# Patient Record
Sex: FEMALE | URBAN - METROPOLITAN AREA
[De-identification: names, ages, dates, MRNs, and addresses within clinical notes are randomized per-mention and may not be internally consistent; named-entity substitution may affect disease eponyms.]

---

## 2023-10-16 ENCOUNTER — OFFICE VISIT (OUTPATIENT)
Dept: ENDOCRINOLOGY CLINIC | Facility: CLINIC | Age: 43
End: 2023-10-16

## 2023-10-16 VITALS — DIASTOLIC BLOOD PRESSURE: 70 MMHG | WEIGHT: 293 LBS | HEART RATE: 77 BPM | SYSTOLIC BLOOD PRESSURE: 101 MMHG

## 2023-10-16 DIAGNOSIS — E03.8 HYPOTHYROIDISM DUE TO HASHIMOTO'S THYROIDITIS: Primary | ICD-10-CM

## 2023-10-16 DIAGNOSIS — E06.3 HYPOTHYROIDISM DUE TO HASHIMOTO'S THYROIDITIS: Primary | ICD-10-CM

## 2023-10-16 DIAGNOSIS — E04.9 THYROID ENLARGEMENT: ICD-10-CM

## 2023-10-16 PROCEDURE — 99203 OFFICE O/P NEW LOW 30 MIN: CPT | Performed by: INTERNAL MEDICINE

## 2023-10-16 RX ORDER — LIOTHYRONINE SODIUM 5 UG/1
5 TABLET ORAL DAILY
Qty: 90 TABLET | Refills: 1 | Status: SHIPPED | OUTPATIENT
Start: 2023-10-16

## 2023-10-16 RX ORDER — LEVOTHYROXINE SODIUM 0.07 MG/1
75 TABLET ORAL
Qty: 90 TABLET | Refills: 1 | Status: SHIPPED | OUTPATIENT
Start: 2023-10-16

## 2023-10-16 RX ORDER — LEVOTHYROXINE SODIUM 88 UG/1
88 TABLET ORAL
COMMUNITY
End: 2023-10-16

## 2023-10-16 RX ORDER — PREDNISONE 20 MG/1
15 TABLET ORAL DAILY
COMMUNITY

## 2023-10-16 RX ORDER — CLINDAMYCIN HYDROCHLORIDE 300 MG/1
1 CAPSULE ORAL 3 TIMES DAILY
COMMUNITY

## 2023-10-16 RX ORDER — HYDROCODONE POLISTIREX AND CHLORPHENIRAMINE POLISTIREX 10; 8 MG/5ML; MG/5ML
SUSPENSION, EXTENDED RELEASE ORAL
COMMUNITY

## 2023-10-16 RX ORDER — NAPROXEN SODIUM 550 MG/1
1 TABLET ORAL EVERY 12 HOURS
COMMUNITY

## 2023-10-16 RX ORDER — FEXOFENADINE HCL AND PSEUDOEPHEDRINE HCI 180; 240 MG/1; MG/1
TABLET, EXTENDED RELEASE ORAL
COMMUNITY

## 2023-10-16 RX ORDER — HYDROCODONE BITARTRATE AND ACETAMINOPHEN 5; 325 MG/1; MG/1
TABLET ORAL
COMMUNITY

## 2023-10-16 NOTE — PROGRESS NOTES
Name: Kurt Wilkins  Date: 10/16/2023    Referring Physician: No ref. provider found    Patient presents with:  Consult: Patient would like to see Endocrinologist to help manage hypothyroidism and rule out cushing's disease. Patient reports she stopped thyroid medication a few months ago due to it not helping with weight gain, hair loss, fatigue. HISTORY OF PRESENT ILLNESS   Kurt Wilkins is a 37year old female who presents for Patient presents with:  Consult: Patient would like to see Endocrinologist to help manage hypothyroidism and rule out cushing's disease. Patient reports she stopped thyroid medication a few months ago due to it not helping with weight gain, hair loss, fatigue. 38 y/o F presents for initial evaluation of hypothyroidism. She was diagnosed with thyroid disease in 2019. At that time she was started on levothyroxine therapy and some symptoms did improve. However she notes persistent fatigue and weight gain despite medication. She has now been off levothyroxine for several months since medication was not helping. She was very concerned that she needed higher dose. Previously she was maintained on Levothyroxine 88mcg PO daily. In addition recommended Cushings Disease evaluation but she got lost to follow up secondary to public health emergency. She does have regular menstrual cycles. +weight gain, +hair loss, +eye brow hair loss. She is trying to limit CHO intake and currently only eating one meal per day. No family h/o thyroid disease. Grandmother DM2. REVIEW OF SYSTEMS  Eyes: no change in vision  Neurologic: no headache, generalized or focal weakness or numbness.   Head: normal  ENT: normal  Lungs: no shortness of breath, wheezing or WILKINSON  Cardiovascular:  no chest pain or palpitations  Gastrointestinal:  no abdominal pain, bowel movement problems  Musculoskeletal: no muscle pain or arthralgia  /Gyne: no frequency or discomfort while urinating  Psychiatric:  no acute distress, anxiety  or depression  Skin: normal moisturized skin    Medications:     Current Outpatient Medications:     clindamycin 300 MG Oral Cap, Take 1 capsule (300 mg total) by mouth 3 (three) times daily. (Patient not taking: Reported on 10/16/2023), Disp: , Rfl:     Fexofenadine-Pseudoephed ER (ALLEGRA-D ALLERGY & CONGESTION) 180-240 MG Oral Tablet 24 Hr, TK 1 T QD. (Patient not taking: Reported on 10/16/2023), Disp: , Rfl:     Hydrocod Jose-Chlorphe Jose ER 10-8 MG/5ML Oral Suspension Extended Release, TK 1 TEA PO BID (Patient not taking: Reported on 10/16/2023), Disp: , Rfl:     HYDROcodone-acetaminophen 5-325 MG Oral Tab, , Disp: , Rfl:     Naproxen Sodium 550 MG Oral Tab, Take 1 tablet (550 mg total) by mouth Q12H. (Patient not taking: Reported on 10/16/2023), Disp: , Rfl:     predniSONE 20 MG Oral Tab, Take 15 mL by mouth daily. (Patient not taking: Reported on 10/16/2023), Disp: , Rfl:     levothyroxine 88 MCG Oral Tab, Take 1 tablet (88 mcg total) by mouth before breakfast. (Patient not taking: Reported on 10/16/2023), Disp: , Rfl:      Allergies:     Penicillins             NAUSEA AND VOMITING    Social History:   Social History    Socioeconomic History      Marital status: Unknown      Medical History:   History reviewed. No pertinent past medical history. Surgical history:   History reviewed. No pertinent surgical history. PHYSICAL EXAMINATION:  /70   Pulse 77   Wt (!) 310 lb (140.6 kg)     General Appearance:  Alert, in no acute distress, well developed  Eyes: normal conjunctivae, sclera.   Ears/Nose/Mouth/Throat/Neck:  normal hearing, normal speech and diffusely enlarged thyroid gland  Musculoskeletal:  normal muscle strength and tone  PV: normal pulses of carotids, pedals  Skin:  normal moisture and skin texture  Hair & Nails:  normal scalp hair     Neuro:  sensory grossly intact and motor grossly intact  Psychiatric:  oriented to time, self, and place  Nutritional:  no abnormal weight gain or loss    ASSESSMENT/PLAN:    Hypothyroidism   - Discussed diagnosis with patient  - Discussed importance of long term treatment  - Discussed possible T3 and T4 replacement  - Restart Levothyroxine 75mcg PO daily   - Start Liothyronine 5mcg PO daily   - Discussed taking in AM and waiting 30-60 min before eating  - Verbalized understanding of risks and benefits  - Recheck TSH, FT4, FT3 in 6 weeeks  - Check Salivary cortisol to complete Cushings work up   - Check Vitamin D  - Further management based on above results    RTC 6 months         10/16/2023  Pham Almonte MD

## 2024-01-22 ENCOUNTER — TELEPHONE (OUTPATIENT)
Dept: ENDOCRINOLOGY CLINIC | Facility: CLINIC | Age: 44
End: 2024-01-22

## 2024-01-22 DIAGNOSIS — E04.1 THYROID NODULE: Primary | ICD-10-CM

## 2024-01-23 ENCOUNTER — MED REC SCAN ONLY (OUTPATIENT)
Dept: ENDOCRINOLOGY CLINIC | Facility: CLINIC | Age: 44
End: 2024-01-23

## 2024-01-23 NOTE — TELEPHONE ENCOUNTER
Dr. Dc,  Spoke to patient - states pain in neck and back of head has not gotten any better   Thyroid US results given to HAVEN Hardy to bring to [facility] for your review - patient is anxious for response  Thanks

## 2024-01-24 NOTE — TELEPHONE ENCOUNTER
Dr. Dc, patient states she's never had a thyroid nodule biopsy. She has a copy of US imaging on disc. She would like to proceed with biopsy at [facility]. Patient does not have insurance so is asking how much it would cost, advised I am not sure off hand, but can give her information to [employer] for cost estimate ([phone] when order is placed.

## 2024-01-24 NOTE — TELEPHONE ENCOUNTER
Order placed.  I suspect the FNA will be fairly expensive.  Would she have insurance in the next year?  We could wait.

## 2024-01-24 NOTE — TELEPHONE ENCOUNTER
Please call patient - she does have several large nodules in her thyroid gland.  She has 3 nodules in the right lobe and 1 nodule in the left lobe.  Thyroid nodules are very common in 50-60% of the population.  The risk of thyroid cancer in a nodule is 3-5% so 95-97% are benign.  However two of her nodules are large enough that they qualify for biopsy.  She has not undergone biopsy in the past correct?  Since she had imaging [facility] we can do biopsy at [facility] however they will need to see images of her US.  Can she get disk from [facility] or did she want to have biopsy done somewhere else?  Thanks.

## 2024-01-24 NOTE — TELEPHONE ENCOUNTER
Spoke to patient to relay message below - patient stated she does not want to wait until she has insurance (was not sure if she would have insurance in the next year)   Patient given # to call for cost estimate ([phone]) and # for scheduling   Patient denied further questions at this time and will call if questions arise

## 2024-04-10 RX ORDER — LEVOTHYROXINE SODIUM 0.07 MG/1
75 TABLET ORAL
Qty: 90 TABLET | Refills: 1 | Status: SHIPPED | OUTPATIENT
Start: 2024-04-10

## 2024-04-10 RX ORDER — LIOTHYRONINE SODIUM 5 UG/1
5 TABLET ORAL DAILY
Qty: 90 TABLET | Refills: 1 | Status: SHIPPED | OUTPATIENT
Start: 2024-04-10

## 2024-04-10 NOTE — TELEPHONE ENCOUNTER
Endocrine refill protocol for medications for hypothyroidism and hyperthyroidism    Protocol Criteria:  Appointment with Endocrinology completed in the last 12 months or scheduled in the next  6months     Verify appointment has been completed or scheduled in the appropriate timeline. If so can send a 90 day supply with 1 refill per provider protocol.    Normal TSH result in the past 12 months   Review recent telephone encounters and mychart communications with patient to ensure a dose change has not occurred since last office visit that was not updated in the medication history list   Last completed office visit:10/16/23  Next scheduled Follow up: no f/u lmtcb  Last TSH result: 0.01

## 2024-06-23 ENCOUNTER — HOSPITAL ENCOUNTER (EMERGENCY)
Age: 44
Discharge: HOME | End: 2024-06-23
Attending: EMERGENCY MEDICINE

## 2024-06-23 ENCOUNTER — APPOINTMENT (OUTPATIENT)
Dept: GENERAL RADIOLOGY | Age: 44
End: 2024-06-23
Attending: EMERGENCY MEDICINE

## 2024-06-23 VITALS
RESPIRATION RATE: 16 BRPM | TEMPERATURE: 97.7 F | HEART RATE: 74 BPM | DIASTOLIC BLOOD PRESSURE: 78 MMHG | SYSTOLIC BLOOD PRESSURE: 164 MMHG | OXYGEN SATURATION: 98 % | BODY MASS INDEX: 43.4 KG/M2 | HEIGHT: 69 IN | WEIGHT: 293 LBS

## 2024-06-23 DIAGNOSIS — R07.89 ATYPICAL CHEST PAIN: Primary | ICD-10-CM

## 2024-06-23 LAB
ALBUMIN SERPL-MCNC: 3.6 G/DL (ref 3.6–5.1)
ALBUMIN/GLOB SERPL: 0.9 {RATIO} (ref 1–2.4)
ALP SERPL-CCNC: 85 UNITS/L (ref 45–117)
ALT SERPL-CCNC: 29 UNITS/L
ANION GAP SERPL CALC-SCNC: 9 MMOL/L (ref 7–19)
AST SERPL-CCNC: 43 UNITS/L
ATRIAL RATE (BPM): 85
BASOPHILS # BLD: 0 K/MCL (ref 0–0.3)
BASOPHILS NFR BLD: 0 %
BILIRUB SERPL-MCNC: 1.7 MG/DL (ref 0.2–1)
BUN SERPL-MCNC: 15 MG/DL (ref 6–20)
BUN/CREAT SERPL: 22 (ref 7–25)
CALCIUM SERPL-MCNC: 9 MG/DL (ref 8.4–10.2)
CHLORIDE SERPL-SCNC: 108 MMOL/L (ref 97–110)
CO2 SERPL-SCNC: 26 MMOL/L (ref 21–32)
CREAT SERPL-MCNC: 0.68 MG/DL (ref 0.51–0.95)
DEPRECATED RDW RBC: 39.9 FL (ref 39–50)
EGFRCR SERPLBLD CKD-EPI 2021: >90 ML/MIN/{1.73_M2}
EOSINOPHIL # BLD: 0.1 K/MCL (ref 0–0.5)
EOSINOPHIL NFR BLD: 1 %
ERYTHROCYTE [DISTWIDTH] IN BLOOD: 13.2 % (ref 11–15)
FASTING DURATION TIME PATIENT: ABNORMAL H
GLOBULIN SER-MCNC: 3.9 G/DL (ref 2–4)
GLUCOSE SERPL-MCNC: 147 MG/DL (ref 70–99)
HCT VFR BLD CALC: 40.5 % (ref 36–46.5)
HGB BLD-MCNC: 14.4 G/DL (ref 12–15.5)
IMM GRANULOCYTES # BLD AUTO: 0 K/MCL (ref 0–0.2)
IMM GRANULOCYTES # BLD: 0 %
LIPASE SERPL-CCNC: 11 UNITS/L (ref 15–77)
LYMPHOCYTES # BLD: 1.4 K/MCL (ref 1–4.8)
LYMPHOCYTES NFR BLD: 20 %
MCH RBC QN AUTO: 30.3 PG (ref 26–34)
MCHC RBC AUTO-ENTMCNC: 35.6 G/DL (ref 32–36.5)
MCV RBC AUTO: 85.3 FL (ref 78–100)
MONOCYTES # BLD: 0.3 K/MCL (ref 0.3–0.9)
MONOCYTES NFR BLD: 5 %
NEUTROPHILS # BLD: 5.1 K/MCL (ref 1.8–7.7)
NEUTROPHILS NFR BLD: 74 %
NRBC BLD MANUAL-RTO: 0 /100 WBC
P AXIS (DEGREES): 67
PLATELET # BLD AUTO: 231 K/MCL (ref 140–450)
POTASSIUM SERPL-SCNC: 4.5 MMOL/L (ref 3.4–5.1)
PR-INTERVAL (MSEC): 128
PROT SERPL-MCNC: 7.5 G/DL (ref 6.4–8.2)
QRS-INTERVAL (MSEC): 84
QT-INTERVAL (MSEC): 356
QTC: 423
R AXIS (DEGREES): 67
RBC # BLD: 4.75 MIL/MCL (ref 4–5.2)
REPORT TEXT: NORMAL
SODIUM SERPL-SCNC: 138 MMOL/L (ref 135–145)
T AXIS (DEGREES): 69
TROPONIN I SERPL DL<=0.01 NG/ML-MCNC: <4 NG/L
VENTRICULAR RATE EKG/MIN (BPM): 85
WBC # BLD: 6.9 K/MCL (ref 4.2–11)

## 2024-06-23 PROCEDURE — 99284 EMERGENCY DEPT VISIT MOD MDM: CPT | Performed by: EMERGENCY MEDICINE

## 2024-06-23 PROCEDURE — 84484 ASSAY OF TROPONIN QUANT: CPT | Performed by: EMERGENCY MEDICINE

## 2024-06-23 PROCEDURE — 85025 COMPLETE CBC W/AUTO DIFF WBC: CPT | Performed by: EMERGENCY MEDICINE

## 2024-06-23 PROCEDURE — 80053 COMPREHEN METABOLIC PANEL: CPT | Performed by: EMERGENCY MEDICINE

## 2024-06-23 PROCEDURE — 71046 X-RAY EXAM CHEST 2 VIEWS: CPT

## 2024-06-23 PROCEDURE — 36415 COLL VENOUS BLD VENIPUNCTURE: CPT

## 2024-06-23 PROCEDURE — 83690 ASSAY OF LIPASE: CPT | Performed by: EMERGENCY MEDICINE

## 2024-06-23 PROCEDURE — 93010 ELECTROCARDIOGRAM REPORT: CPT | Performed by: INTERNAL MEDICINE

## 2024-06-23 PROCEDURE — 93005 ELECTROCARDIOGRAM TRACING: CPT | Performed by: EMERGENCY MEDICINE

## 2024-06-23 PROCEDURE — 99285 EMERGENCY DEPT VISIT HI MDM: CPT

## 2024-06-23 ASSESSMENT — HEART SCORE
EKG: NORMAL
TROPONIN: EQUAL OR LESS THAN NORMAL LIMIT
RISK FACTORS: 1-2 RISK FACTORS
HEART SCORE: 1
AGE: LESS THAN OR EQUAL TO 45
HISTORY: SLIGHTLY SUSPICIOUS

## 2024-06-23 ASSESSMENT — PAIN SCALES - GENERAL: PAINLEVEL_OUTOF10: 2

## 2025-06-06 ENCOUNTER — TELEPHONE (OUTPATIENT)
Dept: ENDOCRINOLOGY CLINIC | Facility: CLINIC | Age: 45
End: 2025-06-06

## 2025-06-06 DIAGNOSIS — E06.3 HYPOTHYROIDISM DUE TO HASHIMOTO'S THYROIDITIS: ICD-10-CM

## 2025-06-06 DIAGNOSIS — E04.1 THYROID NODULE: Primary | ICD-10-CM

## 2025-06-06 DIAGNOSIS — E04.9 THYROID ENLARGEMENT: ICD-10-CM

## 2025-06-06 NOTE — TELEPHONE ENCOUNTER
Patient states that she misplaced the order for Thyroid Biopsy.  She states that it was given to her a year ago.  Patient is requesting to speak with an RN.   Please call

## 2025-06-10 NOTE — TELEPHONE ENCOUNTER
Patient states Doctor Dao prescribed the biopsy 1 year ago - per previous note \"TE dtd 1/22/24: FNA ordered but may have been cost prohibitive d/t insurance coverage\".  No FNA ordere is currently under Imaging chart review.    Patient stated that when she called the Estimate department needs, they state she needs a CPT code to give her an estimate. She states she needs to have it done.    Last Office Visit: 10/16/2023  Patient declined to schedule Next Office Visit.    Doctor Dao,  Patient would need a new FNA order to be placed.  Please advise.  Thank you.

## 2025-06-10 NOTE — TELEPHONE ENCOUNTER
LM to call clinic - per TE dtd 1/22/24: FNA ordered but may have been cost prohibitive d/t insurance coverage

## 2025-06-10 NOTE — TELEPHONE ENCOUNTER
Not seen since 10/2023  Will defer to Dr Dc   She will be back in th eoffice on Thursday   thanks

## 2025-06-11 NOTE — TELEPHONE ENCOUNTER
Spoke to patient to relay message below - patient stated understanding - follow-up scheduled 6/18/25 in [place] (address given to patient)  Thyroid US ordered and phone # for scheduling given to patient

## 2025-06-11 NOTE — TELEPHONE ENCOUNTER
At this point the US is over a year old.  She will need new imaging and an office visit.  Please place order for Thyroid US and schedule visit. Thanks.

## 2025-06-18 ENCOUNTER — OFFICE VISIT (OUTPATIENT)
Dept: ENDOCRINOLOGY CLINIC | Facility: CLINIC | Age: 45
End: 2025-06-18

## 2025-06-18 VITALS — DIASTOLIC BLOOD PRESSURE: 88 MMHG | WEIGHT: 293 LBS | HEART RATE: 92 BPM | SYSTOLIC BLOOD PRESSURE: 129 MMHG

## 2025-06-18 DIAGNOSIS — E06.3 HYPOTHYROIDISM DUE TO HASHIMOTO'S THYROIDITIS: Primary | ICD-10-CM

## 2025-06-18 PROCEDURE — 99213 OFFICE O/P EST LOW 20 MIN: CPT | Performed by: INTERNAL MEDICINE

## 2025-06-18 RX ORDER — LEVOTHYROXINE SODIUM 200 UG/1
200 TABLET ORAL DAILY
COMMUNITY
Start: 2025-06-03

## 2025-06-18 NOTE — PROGRESS NOTES
Name: Zuri Reyes  Date: 6/18/2025    Referring Physician: No ref. provider found    Chief Complaint   Patient presents with    Hypothyroidism       HISTORY OF PRESENT ILLNESS   Zuri Reyes is a 45 year old female who presents for   Chief Complaint   Patient presents with    Hypothyroidism     46 y/o F presents for follow up evaluation of hypothyroidism.  She was diagnosed with thyroid disease in 2019.  At that time she was started on levothyroxine therapy and some symptoms did improve. However she notes persistent fatigue and weight gain despite medication.     She has now been off levothyroxine for several months since medication was not helping.  She was very concerned that she needed higher dose.  Previously she was maintained on Levothyroxine 88mcg PO daily.      In addition recommended Cushings Disease evaluation but she got lost to follow up secondary to public health emergency.      She does have regular menstrual cycles.     +weight gain, +hair loss, +eye brow hair loss.      She is trying to limit CHO intake and currently only eating one meal per day.     No family h/o thyroid disease.  Grandmother DM2.     6/2025  She has been lost to follow up for 18 months.  Since that time the levothyroxine dose has been adjusted by her PCP.  She is currently maintained on Levothyroxine 200mcg PO daily, taking in AM and waiting to eat.  She continues to have mild fatigue but improved on higher dose. She also notes some mild compression symptoms in neck.     REVIEW OF SYSTEMS  Eyes: no change in vision  Neurologic: no headache, generalized or focal weakness or numbness.  Head: normal  ENT: normal  Lungs: no shortness of breath, wheezing or WILKINSON  Cardiovascular:  no chest pain or palpitations  Gastrointestinal:  no abdominal pain, bowel movement problems  Musculoskeletal: no muscle pain or arthralgia  /Gyne: no frequency or discomfort while urinating  Psychiatric:  no acute distress, anxiety  or  depression  Skin: normal moisturized skin    Medications:     Current Outpatient Medications:     levothyroxine 200 MCG Oral Tab, Take 1 tablet (200 mcg total) by mouth daily., Disp: , Rfl:     levothyroxine 75 MCG Oral Tab, Take 1 tablet (75 mcg total) by mouth before breakfast. (Patient not taking: Reported on 6/18/2025), Disp: 90 tablet, Rfl: 1    liothyronine 5 MCG Oral Tab, Take 1 tablet (5 mcg total) by mouth daily. (Patient not taking: Reported on 6/18/2025), Disp: 90 tablet, Rfl: 1    clindamycin 300 MG Oral Cap, Take 1 capsule (300 mg total) by mouth 3 (three) times daily. (Patient not taking: Reported on 6/18/2025), Disp: , Rfl:     Fexofenadine-Pseudoephed ER (ALLEGRA-D ALLERGY & CONGESTION) 180-240 MG Oral Tablet 24 Hr, TK 1 T QD. (Patient not taking: Reported on 6/18/2025), Disp: , Rfl:     Hydrocod Jose-Chlorphe Jose ER 10-8 MG/5ML Oral Suspension Extended Release, TK 1 TEA PO BID (Patient not taking: Reported on 6/18/2025), Disp: , Rfl:     HYDROcodone-acetaminophen 5-325 MG Oral Tab, , Disp: , Rfl:     Naproxen Sodium 550 MG Oral Tab, Take 1 tablet (550 mg total) by mouth Q12H. (Patient not taking: Reported on 6/18/2025), Disp: , Rfl:     predniSONE 20 MG Oral Tab, Take 15 mL by mouth daily. (Patient not taking: Reported on 6/18/2025), Disp: , Rfl:      Allergies:   Allergies   Allergen Reactions    Penicillins NAUSEA AND VOMITING       Social History:   Social History     Socioeconomic History    Marital status: Unknown       Medical History:   No past medical history on file.    Surgical history:   No past surgical history on file.    PHYSICAL EXAMINATION:  /88   Pulse 92   Wt (!) 308 lb (139.7 kg)     General Appearance:  Alert, in no acute distress, well developed  Eyes: normal conjunctivae, sclera.  Ears/Nose/Mouth/Throat/Neck:  normal hearing, normal speech and diffusely enlarged thyroid gland  Musculoskeletal:  normal muscle strength and tone  PV: normal pulses of carotids,  pedals  Skin:  normal moisture and skin texture  Hair & Nails:  normal scalp hair     Neuro:  sensory grossly intact and motor grossly intact  Psychiatric:  oriented to time, self, and place  Nutritional:  no abnormal weight gain or loss    ASSESSMENT/PLAN:    Hypothyroidism   - Discussed diagnosis with patient  - Discussed importance of long term treatment  - Discussed possible T3 and T4 replacement  - Continue Levothyroxine 200mcg PO daily   - Recheck TSH, FT4, FT3   - Discussed taking in AM and waiting 30-60 min before eating  - Verbalized understanding of risks and benefits  - Further management based on above results    2. Thyroid Nodules  - She notes to have large nodule on US in 1/2024  - At that time recommended FNA but she did not have insurance  - Recheck Thyroid US  - Discussed again that she likely needs FNA and will consider since she does not have insurance     RTC 6 months       6/18/2025  Melania Dc MD

## 2025-06-26 ENCOUNTER — TELEPHONE (OUTPATIENT)
Facility: CLINIC | Age: 45
End: 2025-06-26

## 2025-06-26 NOTE — TELEPHONE ENCOUNTER
Wendy calling from [facility] regards patient being there now. If order for ultrasound for thyroid can be sent. Attempted to reach no response. Please call.     Fax: [phone]

## 2025-07-10 ENCOUNTER — TELEPHONE (OUTPATIENT)
Dept: ENDOCRINOLOGY CLINIC | Facility: CLINIC | Age: 45
End: 2025-07-10

## 2025-07-10 DIAGNOSIS — E04.1 THYROID NODULE: Primary | ICD-10-CM

## 2025-07-10 NOTE — TELEPHONE ENCOUNTER
Please call patient - thyroid US demonstrated that two of her larger nodules have decreased in size which is good news.  One of the nodules on the left has increased in size compared to last year.  We should continue to monitor with repeat US in one year.

## 2025-07-23 NOTE — TELEPHONE ENCOUNTER
Patient contacted (Name and  of pt verified). All results and recommendations reviewed. Patient verbalizes understanding, however, she would like to proceed with a biopsy to be safe. Requesting orders for FNA.

## 2025-07-23 NOTE — TELEPHONE ENCOUNTER
Spoke with pt and told her FNA was ordered. Provided CPT code and number for Central Scheduling. Pt verbalized understanding.